# Patient Record
Sex: MALE | Race: OTHER | Employment: OTHER | URBAN - METROPOLITAN AREA
[De-identification: names, ages, dates, MRNs, and addresses within clinical notes are randomized per-mention and may not be internally consistent; named-entity substitution may affect disease eponyms.]

---

## 2022-09-21 NOTE — PATIENT DISCUSSION
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 Patient leaving today with 2 sets of trials in hand. Advised patient to trial lenses then call back to finalize Rx. Patient advised to schedule CL check if any problems with lenses.

## 2024-02-29 ENCOUNTER — NEW PATIENT (OUTPATIENT)
Dept: URBAN - METROPOLITAN AREA CLINIC 36 | Facility: CLINIC | Age: 74
End: 2024-02-29

## 2024-02-29 DIAGNOSIS — H25.813: ICD-10-CM

## 2024-02-29 DIAGNOSIS — H52.7: ICD-10-CM

## 2024-02-29 DIAGNOSIS — E11.9: ICD-10-CM

## 2024-02-29 DIAGNOSIS — H40.051: ICD-10-CM

## 2024-02-29 DIAGNOSIS — H35.372: ICD-10-CM

## 2024-02-29 PROCEDURE — 92134 CPTRZ OPH DX IMG PST SGM RTA: CPT

## 2024-02-29 PROCEDURE — 92004 COMPRE OPH EXAM NEW PT 1/>: CPT

## 2024-02-29 PROCEDURE — 92015 DETERMINE REFRACTIVE STATE: CPT

## 2024-02-29 ASSESSMENT — VISUAL ACUITY
OS_SC: J4
OD_SC: 20/400
OS_SC: 20/20-1
OD_CC: J>12
OS_CC: J2
OD_SC: J>12

## 2024-02-29 ASSESSMENT — TONOMETRY
OS_IOP_MMHG: 20
OD_IOP_MMHG: 22

## 2024-03-04 ENCOUNTER — CONSULTATION/EVALUATION (OUTPATIENT)
Dept: URBAN - METROPOLITAN AREA CLINIC 36 | Facility: CLINIC | Age: 74
End: 2024-03-04

## 2024-03-04 DIAGNOSIS — E11.9: ICD-10-CM

## 2024-03-04 DIAGNOSIS — L71.9: ICD-10-CM

## 2024-03-04 DIAGNOSIS — H40.051: ICD-10-CM

## 2024-03-04 DIAGNOSIS — H25.813: ICD-10-CM

## 2024-03-04 DIAGNOSIS — H35.372: ICD-10-CM

## 2024-03-04 PROCEDURE — 92136 OPHTHALMIC BIOMETRY: CPT

## 2024-03-04 PROCEDURE — V2799PMN IMPRIMIS PRED-MOXI-NEPAF 5ML

## 2024-03-04 PROCEDURE — 92025-1 CORNEAL TOPOGRAPHY, INS

## 2024-03-04 PROCEDURE — 99214 OFFICE O/P EST MOD 30 MIN: CPT

## 2024-03-04 ASSESSMENT — VISUAL ACUITY
OS_SC: J6
OS_SC: 20/20-2
OD_SC: 20/400
OD_SC: <J12

## 2024-03-04 ASSESSMENT — TONOMETRY
OD_IOP_MMHG: 16
OS_IOP_MMHG: 16

## 2024-03-08 ENCOUNTER — SURGERY/PROCEDURE (OUTPATIENT)
Facility: LOCATION | Age: 74
End: 2024-03-08

## 2024-03-08 ENCOUNTER — TECH ONLY (OUTPATIENT)
Dept: URBAN - METROPOLITAN AREA CLINIC 39 | Facility: CLINIC | Age: 74
End: 2024-03-08

## 2024-03-08 ENCOUNTER — PRE-OP/H&P (OUTPATIENT)
Facility: LOCATION | Age: 74
End: 2024-03-08

## 2024-03-08 DIAGNOSIS — H25.813: ICD-10-CM

## 2024-03-08 DIAGNOSIS — H40.051: ICD-10-CM

## 2024-03-08 DIAGNOSIS — H35.372: ICD-10-CM

## 2024-03-08 DIAGNOSIS — L71.9: ICD-10-CM

## 2024-03-08 DIAGNOSIS — Z96.1: ICD-10-CM

## 2024-03-08 DIAGNOSIS — E11.9: ICD-10-CM

## 2024-03-08 PROCEDURE — 66999LNSR LENSAR LASER FOR CAT SX

## 2024-03-08 PROCEDURE — 66984CV REMOVE CATARACT, INSERT LENS, CUSTOM VISION

## 2024-03-08 PROCEDURE — 99211HP PRE-OP

## 2024-03-08 PROCEDURE — 99211T TECH SERVICE

## 2024-03-08 PROCEDURE — 99199PCV PROF CUSTOM VISION PACKAGE

## 2024-03-08 ASSESSMENT — VISUAL ACUITY
OD_SC: 20/80
OD_SC: <J12

## 2024-03-08 ASSESSMENT — TONOMETRY: OD_IOP_MMHG: 14

## 2024-03-11 ENCOUNTER — POST-OP (OUTPATIENT)
Dept: URBAN - METROPOLITAN AREA CLINIC 36 | Facility: CLINIC | Age: 74
End: 2024-03-11

## 2024-03-11 DIAGNOSIS — Z96.1: ICD-10-CM

## 2024-03-11 DIAGNOSIS — H25.812: ICD-10-CM

## 2024-03-11 DIAGNOSIS — E11.9: ICD-10-CM

## 2024-03-11 ASSESSMENT — VISUAL ACUITY
OD_SC: 20/100-1
OS_SC: 20/20-1
OU_SC: 20/20-1

## 2024-03-11 ASSESSMENT — TONOMETRY
OD_IOP_MMHG: 19
OS_IOP_MMHG: 19

## 2024-03-19 ENCOUNTER — POST-OP (OUTPATIENT)
Dept: URBAN - METROPOLITAN AREA CLINIC 36 | Facility: CLINIC | Age: 74
End: 2024-03-19

## 2024-03-19 DIAGNOSIS — E11.9: ICD-10-CM

## 2024-03-19 DIAGNOSIS — H25.812: ICD-10-CM

## 2024-03-19 ASSESSMENT — VISUAL ACUITY
OU_SC: J10
OD_SC: 20/25+1
OD_SC: J10
OS_SC: J10
OS_SC: 20/25+1

## 2024-03-19 ASSESSMENT — TONOMETRY
OD_IOP_MMHG: 18
OS_IOP_MMHG: 18

## 2025-03-19 ENCOUNTER — PREPPED CHART (OUTPATIENT)
Age: 75
End: 2025-03-19